# Patient Record
Sex: FEMALE | Race: OTHER | NOT HISPANIC OR LATINO
[De-identification: names, ages, dates, MRNs, and addresses within clinical notes are randomized per-mention and may not be internally consistent; named-entity substitution may affect disease eponyms.]

---

## 2017-01-09 ENCOUNTER — MESSAGE (OUTPATIENT)
Age: 32
End: 2017-01-09

## 2018-01-24 ENCOUNTER — MESSAGE (OUTPATIENT)
Age: 33
End: 2018-01-24

## 2018-04-26 ENCOUNTER — MESSAGE (OUTPATIENT)
Age: 33
End: 2018-04-26

## 2019-02-01 ENCOUNTER — APPOINTMENT (OUTPATIENT)
Dept: INTERNAL MEDICINE | Facility: CLINIC | Age: 34
End: 2019-02-01
Payer: COMMERCIAL

## 2019-02-01 VITALS
RESPIRATION RATE: 14 BRPM | HEIGHT: 63 IN | OXYGEN SATURATION: 96 % | DIASTOLIC BLOOD PRESSURE: 60 MMHG | HEART RATE: 76 BPM | SYSTOLIC BLOOD PRESSURE: 120 MMHG | TEMPERATURE: 98 F | BODY MASS INDEX: 22.95 KG/M2 | WEIGHT: 129.5 LBS

## 2019-02-01 DIAGNOSIS — M25.561 PAIN IN RIGHT KNEE: ICD-10-CM

## 2019-02-01 PROCEDURE — 99395 PREV VISIT EST AGE 18-39: CPT

## 2019-02-01 PROCEDURE — 36415 COLL VENOUS BLD VENIPUNCTURE: CPT

## 2019-02-02 PROBLEM — M25.561 KNEE PAIN, RIGHT: Status: ACTIVE | Noted: 2019-02-02

## 2019-02-02 LAB
ALBUMIN SERPL ELPH-MCNC: 4.9 G/DL
ALP BLD-CCNC: 61 U/L
ALT SERPL-CCNC: 20 U/L
ANION GAP SERPL CALC-SCNC: 13 MMOL/L
AST SERPL-CCNC: 19 U/L
BASOPHILS # BLD AUTO: 0.01 K/UL
BASOPHILS NFR BLD AUTO: 0.2 %
BILIRUB SERPL-MCNC: 0.4 MG/DL
BUN SERPL-MCNC: 19 MG/DL
CALCIUM SERPL-MCNC: 9.8 MG/DL
CHLORIDE SERPL-SCNC: 105 MMOL/L
CHOLEST SERPL-MCNC: 161 MG/DL
CHOLEST/HDLC SERPL: 2.6 RATIO
CO2 SERPL-SCNC: 26 MMOL/L
CREAT SERPL-MCNC: 0.75 MG/DL
EOSINOPHIL # BLD AUTO: 0.11 K/UL
EOSINOPHIL NFR BLD AUTO: 1.9 %
GLUCOSE SERPL-MCNC: 95 MG/DL
HCT VFR BLD CALC: 41 %
HDLC SERPL-MCNC: 63 MG/DL
HGB BLD-MCNC: 13 G/DL
IMM GRANULOCYTES NFR BLD AUTO: 0.2 %
LDLC SERPL CALC-MCNC: 79 MG/DL
LYMPHOCYTES # BLD AUTO: 2.37 K/UL
LYMPHOCYTES NFR BLD AUTO: 40.3 %
MAN DIFF?: NORMAL
MCHC RBC-ENTMCNC: 28.4 PG
MCHC RBC-ENTMCNC: 31.7 GM/DL
MCV RBC AUTO: 89.5 FL
MONOCYTES # BLD AUTO: 0.4 K/UL
MONOCYTES NFR BLD AUTO: 6.8 %
NEUTROPHILS # BLD AUTO: 2.98 K/UL
NEUTROPHILS NFR BLD AUTO: 50.6 %
PLATELET # BLD AUTO: 216 K/UL
POTASSIUM SERPL-SCNC: 4.6 MMOL/L
PROT SERPL-MCNC: 7.3 G/DL
RBC # BLD: 4.58 M/UL
RBC # FLD: 13.4 %
SODIUM SERPL-SCNC: 144 MMOL/L
TRIGL SERPL-MCNC: 95 MG/DL
WBC # FLD AUTO: 5.88 K/UL

## 2019-02-02 NOTE — HISTORY OF PRESENT ILLNESS
[FreeTextEntry1] : wellness [de-identified] : \par Plays soccer, right knee bother \par Has an IUD - 6 months\par gyn appt coming up\par \par Has a mild sore throat.

## 2019-02-02 NOTE — PLAN
[FreeTextEntry1] : roight knee pain -  likely patellofemoral syndrome\par  - quad strengthening exercises reviewed\par labs today

## 2019-02-04 LAB — BACTERIA THROAT CULT: NORMAL

## 2019-02-12 ENCOUNTER — TRANSCRIPTION ENCOUNTER (OUTPATIENT)
Age: 34
End: 2019-02-12

## 2019-04-03 ENCOUNTER — APPOINTMENT (OUTPATIENT)
Dept: INTERNAL MEDICINE | Facility: CLINIC | Age: 34
End: 2019-04-03
Payer: COMMERCIAL

## 2019-04-03 VITALS
TEMPERATURE: 97.7 F | DIASTOLIC BLOOD PRESSURE: 70 MMHG | HEIGHT: 63 IN | OXYGEN SATURATION: 98 % | RESPIRATION RATE: 14 BRPM | HEART RATE: 74 BPM | WEIGHT: 129 LBS | BODY MASS INDEX: 22.86 KG/M2 | SYSTOLIC BLOOD PRESSURE: 112 MMHG

## 2019-04-03 PROCEDURE — 99213 OFFICE O/P EST LOW 20 MIN: CPT

## 2019-04-03 NOTE — PLAN
[FreeTextEntry1] : Tension type headache, likely related to BCP\par stop BCP - reach out to gyn to discuss\par Naproxen 400mg bid\par quiet environment and fluids and monitor\par If headache worsens or develops any sequelae, viisno changes speech etc to go to er

## 2019-04-03 NOTE — PHYSICAL EXAM
[No Acute Distress] : no acute distress [Well Nourished] : well nourished [Well Developed] : well developed [Supple] : supple [No Lymphadenopathy] : no lymphadenopathy [Normal Gait] : normal gait [Coordination Grossly Intact] : coordination grossly intact [No Focal Deficits] : no focal deficits [Normal Affect] : the affect was normal [Normal Insight/Judgement] : insight and judgment were intact [de-identified] : cn 2-12 intact

## 2019-04-03 NOTE — HISTORY OF PRESENT ILLNESS
[FreeTextEntry1] : headache [de-identified] : severe headache for 2 days, not getting better.  Taking tylenol\par Having a lot of IUD issues.  then a new BCP causing acne.  now with HA.  THis occurred last time after the nuvaring.  \par Ha is mid head, pulsatile.  no fever or chlls, no vision changes.\par today is worse than yesterday.    7/10 pain.

## 2019-04-03 NOTE — REVIEW OF SYSTEMS
[Fever] : no fever [Chills] : no chills [Headache] : headache [Dizziness] : no dizziness [Confusion] : no confusion [Unsteady Walking] : no ataxia [Negative] : Respiratory

## 2019-08-15 ENCOUNTER — TRANSCRIPTION ENCOUNTER (OUTPATIENT)
Age: 34
End: 2019-08-15

## 2019-08-16 ENCOUNTER — APPOINTMENT (OUTPATIENT)
Dept: INTERNAL MEDICINE | Facility: CLINIC | Age: 34
End: 2019-08-16
Payer: COMMERCIAL

## 2019-08-16 ENCOUNTER — LABORATORY RESULT (OUTPATIENT)
Age: 34
End: 2019-08-16

## 2019-08-16 VITALS
DIASTOLIC BLOOD PRESSURE: 60 MMHG | BODY MASS INDEX: 22.86 KG/M2 | HEIGHT: 63 IN | HEART RATE: 104 BPM | RESPIRATION RATE: 14 BRPM | SYSTOLIC BLOOD PRESSURE: 90 MMHG | OXYGEN SATURATION: 96 % | WEIGHT: 129 LBS | TEMPERATURE: 98.2 F

## 2019-08-16 DIAGNOSIS — A08.4 VIRAL INTESTINAL INFECTION, UNSPECIFIED: ICD-10-CM

## 2019-08-16 PROCEDURE — 99213 OFFICE O/P EST LOW 20 MIN: CPT

## 2019-08-16 NOTE — PHYSICAL EXAM
[Normal] : normal rate, regular rhythm, normal S1 and S2 and no murmur heard [Soft] : abdomen soft [de-identified] : diffuse mild td, no rebound or guarding  neg murphys [de-identified] : mild right sided cva tenderness

## 2019-08-16 NOTE — ASSESSMENT
[FreeTextEntry1] : Fluids, tylenol q6 hrs, wash hands.\par  rec avoiding contact with elderly until 24 hours fever free\par ua and urine cx with mild cva tenderness

## 2019-08-16 NOTE — HISTORY OF PRESENT ILLNESS
[FreeTextEntry1] : feeling sick [de-identified] : 48 hr ilana began having fever and pain on her right side and cramps all over her body. Has persisted.  Normal appetite. - no change in bowel or bladder issue.  NO nausea or vmoiting.  \par Son and daughter had fever only last weekend.   Also Mother in law with simliar symptoms.  \par This AM took 1gm tylenol.    Had just traveled last weekend from [place].

## 2019-08-16 NOTE — REVIEW OF SYSTEMS
[Fatigue] : fatigue [Abdominal Pain] : abdominal pain [Nausea] : no nausea [Constipation] : no constipation [Diarrhea] : diarrhea [Vomiting] : no vomiting [Heartburn] : no heartburn [Negative] : Genitourinary [FreeTextEntry9] : as above body aches

## 2019-08-18 ENCOUNTER — MESSAGE (OUTPATIENT)
Age: 34
End: 2019-08-18

## 2019-08-18 LAB
APPEARANCE: ABNORMAL
BILIRUBIN URINE: ABNORMAL
BLOOD URINE: ABNORMAL
COLOR: ABNORMAL
GLUCOSE QUALITATIVE U: NEGATIVE
KETONES URINE: NEGATIVE
LEUKOCYTE ESTERASE URINE: NEGATIVE
NITRITE URINE: NEGATIVE
PH URINE: 6
PROTEIN URINE: NORMAL
SPECIFIC GRAVITY URINE: >=1.03
UROBILINOGEN URINE: NORMAL

## 2019-08-19 ENCOUNTER — TRANSCRIPTION ENCOUNTER (OUTPATIENT)
Age: 34
End: 2019-08-19

## 2019-09-29 ENCOUNTER — TRANSCRIPTION ENCOUNTER (OUTPATIENT)
Age: 34
End: 2019-09-29

## 2019-09-30 ENCOUNTER — RESULT REVIEW (OUTPATIENT)
Age: 34
End: 2019-09-30

## 2019-10-18 ENCOUNTER — APPOINTMENT (OUTPATIENT)
Dept: INTERNAL MEDICINE | Facility: CLINIC | Age: 34
End: 2019-10-18
Payer: COMMERCIAL

## 2019-10-18 VITALS
OXYGEN SATURATION: 99 % | SYSTOLIC BLOOD PRESSURE: 98 MMHG | HEART RATE: 86 BPM | BODY MASS INDEX: 22.86 KG/M2 | WEIGHT: 129 LBS | TEMPERATURE: 98.2 F | HEIGHT: 63 IN | RESPIRATION RATE: 14 BRPM | DIASTOLIC BLOOD PRESSURE: 70 MMHG

## 2019-10-18 DIAGNOSIS — M54.5 LOW BACK PAIN: ICD-10-CM

## 2019-10-18 DIAGNOSIS — Z87.09 PERSONAL HISTORY OF OTHER DISEASES OF THE RESPIRATORY SYSTEM: ICD-10-CM

## 2019-10-18 DIAGNOSIS — Z23 ENCOUNTER FOR IMMUNIZATION: ICD-10-CM

## 2019-10-18 PROCEDURE — 99214 OFFICE O/P EST MOD 30 MIN: CPT | Mod: 25

## 2019-10-18 PROCEDURE — G0008: CPT

## 2019-10-18 PROCEDURE — 90688 IIV4 VACCINE SPLT 0.5 ML IM: CPT

## 2019-10-18 NOTE — PHYSICAL EXAM
[No Acute Distress] : no acute distress [Well Nourished] : well nourished [Well Developed] : well developed [Well-Appearing] : well-appearing [Normal Outer Ear/Nose] : the outer ears and nose were normal in appearance [Normal Sclera/Conjunctiva] : normal sclera/conjunctiva [No Lymphadenopathy] : no lymphadenopathy [Thyroid Normal, No Nodules] : the thyroid was normal and there were no nodules present [No Accessory Muscle Use] : no accessory muscle use [Grossly Normal Strength/Tone] : grossly normal strength/tone [Normal] : affect was normal and insight and judgment were intact [de-identified] : Neg straight leg, pain with extension/rotation, Postive natalya, mild spinal tenderness [de-identified] : R. paravertebral tenderness

## 2019-10-18 NOTE — HISTORY OF PRESENT ILLNESS
[FreeTextEntry1] : Sore throat and back pain [de-identified] : 33F hx Developmental Hip Dysplasia s/p marylin harness now with assymetric hips presenting with 1 month of sore throat and 1 week acute on chronic back pain.\par Sore Throat: Started mid september. Right sided pain of throat. Felt like "swallowing glass." Went to urgent care where rapid strep negative to diagnosed as viral. Does not recall eating anything sharp. Has progressively improved. Has not used any antiinflammatory or other medications. No fever, cough, exudate, or systemic symptoms.\par Back Pain: Reports that since age 14 has had chronic back pain. Diagnosed with L4/L5 or L5/S1 pinched nerve but is not sure. Does PT exercises and that typically manages it. The past week patient will wake up with severe, crippling R. paravertebral pain. Will last ~15minutes and then resolves. No identifiable triggers. Has not tried any medications to help. It is a sharp pain that radiates down the posterior thigh to the knee. No numbness/tingling, no incontinence, no sensory changes.

## 2019-10-18 NOTE — PLAN
[FreeTextEntry1] : Sore Throat: Continue to monitor. If worsens will consider ENT evaluation\par \par Back Pain: Continue home PT exercises. Take Alleve or NSAID daily in the morning. PM&R Consult if persist/worsens.\par \par Flu Shot: Given

## 2019-10-18 NOTE — REVIEW OF SYSTEMS
[Sore Throat] : sore throat [Muscle Pain] : muscle pain [Back Pain] : back pain [Negative] : Psychiatric [Earache] : no earache [Hoarseness] : no hoarseness [Cough] : no cough [Incontinence] : no incontinence

## 2020-10-14 ENCOUNTER — RESULT REVIEW (OUTPATIENT)
Age: 35
End: 2020-10-14

## 2020-10-22 ENCOUNTER — TRANSCRIPTION ENCOUNTER (OUTPATIENT)
Age: 35
End: 2020-10-22

## 2020-11-13 ENCOUNTER — APPOINTMENT (OUTPATIENT)
Dept: INTERNAL MEDICINE | Facility: CLINIC | Age: 35
End: 2020-11-13
Payer: COMMERCIAL

## 2020-11-13 VITALS
TEMPERATURE: 97.4 F | DIASTOLIC BLOOD PRESSURE: 74 MMHG | HEART RATE: 80 BPM | BODY MASS INDEX: 23.74 KG/M2 | OXYGEN SATURATION: 98 % | RESPIRATION RATE: 14 BRPM | SYSTOLIC BLOOD PRESSURE: 98 MMHG | WEIGHT: 134 LBS | HEIGHT: 63 IN

## 2020-11-13 PROCEDURE — 99395 PREV VISIT EST AGE 18-39: CPT | Mod: 25

## 2020-11-13 PROCEDURE — 90471 IMMUNIZATION ADMIN: CPT

## 2020-11-13 PROCEDURE — 99072 ADDL SUPL MATRL&STAF TM PHE: CPT

## 2020-11-13 PROCEDURE — 36415 COLL VENOUS BLD VENIPUNCTURE: CPT

## 2020-11-13 PROCEDURE — 90686 IIV4 VACC NO PRSV 0.5 ML IM: CPT

## 2020-11-13 RX ORDER — FAMOTIDINE 20 MG/1
20 TABLET, FILM COATED ORAL
Qty: 60 | Refills: 5 | Status: ACTIVE | COMMUNITY
Start: 2020-09-28 | End: 1900-01-01

## 2020-11-15 LAB
25(OH)D3 SERPL-MCNC: 38.1 NG/ML
ALBUMIN SERPL ELPH-MCNC: 4.9 G/DL
ALP BLD-CCNC: 49 U/L
ALT SERPL-CCNC: 13 U/L
ANION GAP SERPL CALC-SCNC: 13 MMOL/L
AST SERPL-CCNC: 15 U/L
BASOPHILS # BLD AUTO: 0.04 K/UL
BASOPHILS NFR BLD AUTO: 0.5 %
BILIRUB SERPL-MCNC: 0.2 MG/DL
BUN SERPL-MCNC: 18 MG/DL
CALCIUM SERPL-MCNC: 9.4 MG/DL
CHLORIDE SERPL-SCNC: 104 MMOL/L
CHOLEST SERPL-MCNC: 194 MG/DL
CO2 SERPL-SCNC: 24 MMOL/L
CREAT SERPL-MCNC: 0.75 MG/DL
EOSINOPHIL # BLD AUTO: 0.08 K/UL
EOSINOPHIL NFR BLD AUTO: 1 %
ESTIMATED AVERAGE GLUCOSE: 105 MG/DL
GLUCOSE SERPL-MCNC: 93 MG/DL
HBA1C MFR BLD HPLC: 5.3 %
HCT VFR BLD CALC: 40.1 %
HDLC SERPL-MCNC: 61 MG/DL
HGB BLD-MCNC: 12.6 G/DL
IMM GRANULOCYTES NFR BLD AUTO: 0.2 %
LDLC SERPL CALC-MCNC: 113 MG/DL
LYMPHOCYTES # BLD AUTO: 2.56 K/UL
LYMPHOCYTES NFR BLD AUTO: 31.6 %
MAN DIFF?: NORMAL
MCHC RBC-ENTMCNC: 28.9 PG
MCHC RBC-ENTMCNC: 31.4 GM/DL
MCV RBC AUTO: 92 FL
MONOCYTES # BLD AUTO: 0.49 K/UL
MONOCYTES NFR BLD AUTO: 6.1 %
NEUTROPHILS # BLD AUTO: 4.9 K/UL
NEUTROPHILS NFR BLD AUTO: 60.6 %
NONHDLC SERPL-MCNC: 133 MG/DL
PLATELET # BLD AUTO: 192 K/UL
POTASSIUM SERPL-SCNC: 4.8 MMOL/L
PROT SERPL-MCNC: 7 G/DL
RBC # BLD: 4.36 M/UL
RBC # FLD: 12.9 %
SARS-COV-2 N GENE NPH QL NAA+PROBE: NOT DETECTED
SODIUM SERPL-SCNC: 142 MMOL/L
TRIGL SERPL-MCNC: 99 MG/DL
TSH SERPL-ACNC: 0.83 UIU/ML
WBC # FLD AUTO: 8.09 K/UL

## 2020-11-15 NOTE — HISTORY OF PRESENT ILLNESS
[de-identified] : \par Having stomach issues - Had taken famotidine 14 d in September - It helped a lot.  food can affect it. alcohol affects it as well.  \par back ache - right mid back - doing crunches.   - ibupren helps.  hot shower.  \par Drinking 4 cups coffee a day. - cappuccino with oatmilk\par Working RN at school

## 2020-11-15 NOTE — PLAN
[FreeTextEntry1] : \par \par Abd pain  gerd - to restart famotiidne daily and monitor\par uptudate with hcm\par

## 2020-12-21 PROBLEM — Z87.09 HISTORY OF SORE THROAT: Status: RESOLVED | Noted: 2019-10-18 | Resolved: 2020-12-21

## 2021-10-29 ENCOUNTER — RESULT REVIEW (OUTPATIENT)
Age: 36
End: 2021-10-29

## 2022-06-13 ENCOUNTER — APPOINTMENT (OUTPATIENT)
Dept: INTERNAL MEDICINE | Facility: CLINIC | Age: 37
End: 2022-06-13
Payer: COMMERCIAL

## 2022-06-13 ENCOUNTER — LABORATORY RESULT (OUTPATIENT)
Age: 37
End: 2022-06-13

## 2022-06-13 VITALS
RESPIRATION RATE: 14 BRPM | TEMPERATURE: 98.1 F | DIASTOLIC BLOOD PRESSURE: 70 MMHG | HEART RATE: 68 BPM | WEIGHT: 139 LBS | SYSTOLIC BLOOD PRESSURE: 100 MMHG | HEIGHT: 63 IN | OXYGEN SATURATION: 99 % | BODY MASS INDEX: 24.63 KG/M2

## 2022-06-13 PROCEDURE — 99395 PREV VISIT EST AGE 18-39: CPT

## 2022-06-13 PROCEDURE — 36415 COLL VENOUS BLD VENIPUNCTURE: CPT

## 2022-06-13 NOTE — HISTORY OF PRESENT ILLNESS
[de-identified] : 35 yo f here for wellnes and needs froms for insurance buying a house in [place]\par feeling well without c/o

## 2022-06-20 LAB
ALBUMIN SERPL ELPH-MCNC: 4.8 G/DL
ALP BLD-CCNC: 50 U/L
ALT SERPL-CCNC: 15 U/L
ANION GAP SERPL CALC-SCNC: 14 MMOL/L
APPEARANCE: CLEAR
AST SERPL-CCNC: 18 U/L
BASOPHILS # BLD AUTO: 0.02 K/UL
BASOPHILS NFR BLD AUTO: 0.4 %
BILIRUB SERPL-MCNC: 0.6 MG/DL
BILIRUBIN URINE: NEGATIVE
BLOOD URINE: NEGATIVE
BUN SERPL-MCNC: 17 MG/DL
CALCIUM SERPL-MCNC: 9.1 MG/DL
CHLORIDE SERPL-SCNC: 104 MMOL/L
CHOLEST SERPL-MCNC: 200 MG/DL
CO2 SERPL-SCNC: 22 MMOL/L
COLOR: YELLOW
CREAT SERPL-MCNC: 0.78 MG/DL
EGFR: 102 ML/MIN/1.73M2
EOSINOPHIL # BLD AUTO: 0.08 K/UL
EOSINOPHIL NFR BLD AUTO: 1.5 %
ERYTHROCYTE [SEDIMENTATION RATE] IN BLOOD BY WESTERGREN METHOD: 4 MM/HR
GGT SERPL-CCNC: 20 U/L
GLUCOSE QUALITATIVE U: NEGATIVE
GLUCOSE SERPL-MCNC: 94 MG/DL
HBV SURFACE AB SER QL: REACTIVE
HBV SURFACE AG SER QL: NONREACTIVE
HCT VFR BLD CALC: 39.7 %
HCV AB SER QL: NONREACTIVE
HCV S/CO RATIO: 0.11 S/CO
HDLC SERPL-MCNC: 65 MG/DL
HGB BLD-MCNC: 12.5 G/DL
HIV1+2 AB SPEC QL IA.RAPID: NONREACTIVE
IMM GRANULOCYTES NFR BLD AUTO: 0.2 %
KETONES URINE: NORMAL
LDLC SERPL CALC-MCNC: 121 MG/DL
LEUKOCYTE ESTERASE URINE: NEGATIVE
LYMPHOCYTES # BLD AUTO: 1.85 K/UL
LYMPHOCYTES NFR BLD AUTO: 34.3 %
MAN DIFF?: NORMAL
MCHC RBC-ENTMCNC: 29.2 PG
MCHC RBC-ENTMCNC: 31.5 GM/DL
MCV RBC AUTO: 92.8 FL
MONOCYTES # BLD AUTO: 0.43 K/UL
MONOCYTES NFR BLD AUTO: 8 %
NEUTROPHILS # BLD AUTO: 3 K/UL
NEUTROPHILS NFR BLD AUTO: 55.6 %
NITRITE URINE: NEGATIVE
NONHDLC SERPL-MCNC: 136 MG/DL
PH URINE: 6
PLATELET # BLD AUTO: 182 K/UL
POTASSIUM SERPL-SCNC: 4.4 MMOL/L
PROT SERPL-MCNC: 6.8 G/DL
PROTEIN URINE: NORMAL
RBC # BLD: 4.28 M/UL
RBC # FLD: 13.3 %
SODIUM SERPL-SCNC: 141 MMOL/L
SPECIFIC GRAVITY URINE: 1.03
TRIGL SERPL-MCNC: 72 MG/DL
URATE SERPL-MCNC: 3.5 MG/DL
UROBILINOGEN URINE: NORMAL
WBC # FLD AUTO: 5.39 K/UL

## 2022-09-15 ENCOUNTER — APPOINTMENT (OUTPATIENT)
Dept: INTERNAL MEDICINE | Facility: CLINIC | Age: 37
End: 2022-09-15

## 2022-09-15 VITALS
BODY MASS INDEX: 25.34 KG/M2 | HEIGHT: 63 IN | OXYGEN SATURATION: 98 % | RESPIRATION RATE: 14 BRPM | DIASTOLIC BLOOD PRESSURE: 72 MMHG | SYSTOLIC BLOOD PRESSURE: 100 MMHG | WEIGHT: 143 LBS | TEMPERATURE: 97.8 F | HEART RATE: 101 BPM

## 2022-09-15 DIAGNOSIS — G44.89 OTHER HEADACHE SYNDROME: ICD-10-CM

## 2022-09-15 PROCEDURE — 99213 OFFICE O/P EST LOW 20 MIN: CPT

## 2022-09-15 RX ORDER — IBUPROFEN 800 MG/1
800 TABLET, FILM COATED ORAL 3 TIMES DAILY
Qty: 20 | Refills: 20 | Status: ACTIVE | COMMUNITY
Start: 2022-09-15 | End: 1900-01-01

## 2022-09-15 NOTE — PHYSICAL EXAM
[Normal] : normal gait, coordination grossly intact, no focal deficits and deep tendon reflexes were 2+ and symmetric [de-identified] : cn 2-12 intact

## 2022-09-15 NOTE — HISTORY OF PRESENT ILLNESS
[FreeTextEntry1] : headache [de-identified] : 37 yo f here for follow up\par covid in July and since then having headaches daily.  trouble focusing.\par other day had sudden spinning. lasted a minute.\par taking abx for both eyes.\par sometimes wakes up with headache.  not sleeping as well. \par headache is b/l and frontal.  \par not a lot of water  two coffees a day\par \par med: tried tylenol

## 2022-09-15 NOTE — PLAN
[FreeTextEntry1] : Post covid headache with memory concern: \par \par \par \par trial of excedrin \par also with ibuprofen 800mg\par \par neuro eval if no sig imporvemetn in next 1-2 weeks\par \par

## 2023-02-01 ENCOUNTER — NON-APPOINTMENT (OUTPATIENT)
Age: 38
End: 2023-02-01

## 2023-02-03 ENCOUNTER — NON-APPOINTMENT (OUTPATIENT)
Age: 38
End: 2023-02-03

## 2023-02-06 ENCOUNTER — TRANSCRIPTION ENCOUNTER (OUTPATIENT)
Age: 38
End: 2023-02-06

## 2023-02-08 LAB
ALBUMIN SERPL ELPH-MCNC: 4.5 G/DL
ALP BLD-CCNC: 103 U/L
ALT SERPL-CCNC: 76 U/L
AST SERPL-CCNC: 22 U/L
BILIRUB DIRECT SERPL-MCNC: 0.1 MG/DL
BILIRUB INDIRECT SERPL-MCNC: 0.1 MG/DL
BILIRUB SERPL-MCNC: 0.2 MG/DL
PROT SERPL-MCNC: 7 G/DL

## 2023-02-09 LAB
HBV SURFACE AB SER QL: REACTIVE
HBV SURFACE AG SER QL: NONREACTIVE
HCV AB SER QL: NONREACTIVE
HCV S/CO RATIO: 0.1 S/CO
HEPATITIS A IGG ANTIBODY: NONREACTIVE

## 2023-05-31 ENCOUNTER — APPOINTMENT (OUTPATIENT)
Dept: INTERNAL MEDICINE | Facility: CLINIC | Age: 38
End: 2023-05-31
Payer: COMMERCIAL

## 2023-05-31 VITALS
SYSTOLIC BLOOD PRESSURE: 103 MMHG | HEART RATE: 109 BPM | BODY MASS INDEX: 24.45 KG/M2 | WEIGHT: 138 LBS | TEMPERATURE: 98.7 F | OXYGEN SATURATION: 98 % | RESPIRATION RATE: 14 BRPM | DIASTOLIC BLOOD PRESSURE: 68 MMHG | HEIGHT: 63 IN

## 2023-05-31 DIAGNOSIS — Z00.00 ENCOUNTER FOR GENERAL ADULT MEDICAL EXAMINATION W/OUT ABNORMAL FINDINGS: ICD-10-CM

## 2023-05-31 DIAGNOSIS — R10.84 GENERALIZED ABDOMINAL PAIN: ICD-10-CM

## 2023-05-31 PROCEDURE — 36415 COLL VENOUS BLD VENIPUNCTURE: CPT

## 2023-05-31 PROCEDURE — 99395 PREV VISIT EST AGE 18-39: CPT | Mod: 25

## 2023-05-31 NOTE — PHYSICAL EXAM
[No Acute Distress] : no acute distress [Well Nourished] : well nourished [Well Developed] : well developed [Well-Appearing] : well-appearing [Normal Sclera/Conjunctiva] : normal sclera/conjunctiva [PERRL] : pupils equal round and reactive to light [EOMI] : extraocular movements intact [Normal Outer Ear/Nose] : the outer ears and nose were normal in appearance [Normal Oropharynx] : the oropharynx was normal [No JVD] : no jugular venous distention [No Lymphadenopathy] : no lymphadenopathy [Supple] : supple [Thyroid Normal, No Nodules] : the thyroid was normal and there were no nodules present [No Respiratory Distress] : no respiratory distress  [No Accessory Muscle Use] : no accessory muscle use [Clear to Auscultation] : lungs were clear to auscultation bilaterally [Normal Rate] : normal rate  [Regular Rhythm] : with a regular rhythm [Normal S1, S2] : normal S1 and S2 [No Murmur] : no murmur heard [No Carotid Bruits] : no carotid bruits [No Abdominal Bruit] : a ~M bruit was not heard ~T in the abdomen [No Varicosities] : no varicosities [Pedal Pulses Present] : the pedal pulses are present [No Edema] : there was no peripheral edema [No Palpable Aorta] : no palpable aorta [No Extremity Clubbing/Cyanosis] : no extremity clubbing/cyanosis [Soft] : abdomen soft [Non-distended] : non-distended [No Masses] : no abdominal mass palpated [No HSM] : no HSM [Normal Bowel Sounds] : normal bowel sounds [Normal Posterior Cervical Nodes] : no posterior cervical lymphadenopathy [Normal Anterior Cervical Nodes] : no anterior cervical lymphadenopathy [No CVA Tenderness] : no CVA  tenderness [No Spinal Tenderness] : no spinal tenderness [No Joint Swelling] : no joint swelling [Grossly Normal Strength/Tone] : grossly normal strength/tone [No Rash] : no rash [Coordination Grossly Intact] : coordination grossly intact [No Focal Deficits] : no focal deficits [Normal Gait] : normal gait [Deep Tendon Reflexes (DTR)] : deep tendon reflexes were 2+ and symmetric [Normal Affect] : the affect was normal [Normal Insight/Judgement] : insight and judgment were intact [de-identified] : tenderness pelvic area

## 2023-05-31 NOTE — HISTORY OF PRESENT ILLNESS
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

## 2023-05-31 NOTE — REVIEW OF SYSTEMS
[Skin Rash] : skin rash [Negative] : Heme/Lymph [Constipation] : no constipation [Diarrhea] : diarrhea [FreeTextEntry7] : pelvic pain and heaviness -r sided primarily

## 2023-05-31 NOTE — PLAN
[FreeTextEntry1] : wellness complete -health maintenance up to date\par -will f/u on labs today \par \par pelvic pain - going to GYN next week, likely will need u/s \par \par skin rash -likely allergic but can discuss with derm, will try allegra/claritin and see if there in any improvement \par \par memory concerns -should f/u with neurology to address memory and headaches \par -can cont tylenol/advil \par \par I, Dr. Shree Matos personally performed the evaluation and management services for this patient who presents today with a new problem. The evaluation and management includes conducting the examination assessing all conditions and establishing a new plan of care. Today my NP Harshal Carmona was here and recorded the evaluation and management services.\par

## 2023-06-01 LAB
ALBUMIN SERPL ELPH-MCNC: 4.7 G/DL
ALP BLD-CCNC: 43 U/L
ALT SERPL-CCNC: 22 U/L
ANION GAP SERPL CALC-SCNC: 10 MMOL/L
APPEARANCE: ABNORMAL
AST SERPL-CCNC: 21 U/L
BILIRUB SERPL-MCNC: 0.5 MG/DL
BILIRUBIN URINE: NEGATIVE
BLOOD URINE: NEGATIVE
BUN SERPL-MCNC: 19 MG/DL
CALCIUM SERPL-MCNC: 9.5 MG/DL
CHLORIDE SERPL-SCNC: 103 MMOL/L
CHOLEST SERPL-MCNC: 210 MG/DL
CO2 SERPL-SCNC: 25 MMOL/L
COLOR: NORMAL
CREAT SERPL-MCNC: 0.73 MG/DL
EGFR: 110 ML/MIN/1.73M2
ESTIMATED AVERAGE GLUCOSE: 111 MG/DL
GLUCOSE QUALITATIVE U: NEGATIVE MG/DL
GLUCOSE SERPL-MCNC: 83 MG/DL
HBA1C MFR BLD HPLC: 5.5 %
HDLC SERPL-MCNC: 70 MG/DL
KETONES URINE: NEGATIVE MG/DL
LDLC SERPL CALC-MCNC: 124 MG/DL
LEUKOCYTE ESTERASE URINE: ABNORMAL
NITRITE URINE: NEGATIVE
NONHDLC SERPL-MCNC: 140 MG/DL
PH URINE: 5.5
POTASSIUM SERPL-SCNC: 4.6 MMOL/L
PROT SERPL-MCNC: 6.8 G/DL
PROTEIN URINE: NORMAL MG/DL
SODIUM SERPL-SCNC: 138 MMOL/L
SPECIFIC GRAVITY URINE: 1.03
TRIGL SERPL-MCNC: 78 MG/DL
TSH SERPL-ACNC: 1.18 UIU/ML
UROBILINOGEN URINE: 1 MG/DL

## 2023-06-29 ENCOUNTER — APPOINTMENT (OUTPATIENT)
Dept: UROGYNECOLOGY | Facility: CLINIC | Age: 38
End: 2023-06-29